# Patient Record
Sex: FEMALE | Race: BLACK OR AFRICAN AMERICAN | NOT HISPANIC OR LATINO | Employment: STUDENT | URBAN - METROPOLITAN AREA
[De-identification: names, ages, dates, MRNs, and addresses within clinical notes are randomized per-mention and may not be internally consistent; named-entity substitution may affect disease eponyms.]

---
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
## 2024-03-07 ENCOUNTER — PATIENT MESSAGE (OUTPATIENT)
Dept: PRIMARY CARE | Facility: CLINIC | Age: 19
End: 2024-03-07

## 2024-03-07 DIAGNOSIS — N76.0 ACUTE VAGINITIS: ICD-10-CM

## 2024-03-08 DIAGNOSIS — N76.0 ACUTE VAGINITIS: Primary | ICD-10-CM

## 2024-03-08 RX ORDER — FLUCONAZOLE 150 MG/1
TABLET ORAL
Qty: 2 TABLET | Refills: 0 | Status: SHIPPED | OUTPATIENT
Start: 2024-03-08 | End: 2024-03-11 | Stop reason: SDUPTHER

## 2024-03-11 RX ORDER — FLUCONAZOLE 150 MG/1
TABLET ORAL
Qty: 2 TABLET | Refills: 0 | Status: SHIPPED | OUTPATIENT
Start: 2024-03-11

## 2024-05-02 DIAGNOSIS — F33.0 MILD EPISODE OF RECURRENT MAJOR DEPRESSIVE DISORDER (CMS-HCC): ICD-10-CM

## 2024-05-02 RX ORDER — SERTRALINE HYDROCHLORIDE 100 MG/1
TABLET, FILM COATED ORAL
Qty: 30 TABLET | Refills: 0 | Status: SHIPPED | OUTPATIENT
Start: 2024-05-02 | End: 2024-05-10

## 2024-05-10 ENCOUNTER — OFFICE VISIT (OUTPATIENT)
Dept: PRIMARY CARE | Facility: CLINIC | Age: 19
End: 2024-05-10
Payer: COMMERCIAL

## 2024-05-10 ENCOUNTER — LAB (OUTPATIENT)
Dept: LAB | Facility: LAB | Age: 19
End: 2024-05-10
Payer: COMMERCIAL

## 2024-05-10 VITALS
BODY MASS INDEX: 22.51 KG/M2 | WEIGHT: 152 LBS | HEART RATE: 100 BPM | SYSTOLIC BLOOD PRESSURE: 116 MMHG | HEIGHT: 69 IN | DIASTOLIC BLOOD PRESSURE: 84 MMHG

## 2024-05-10 DIAGNOSIS — F41.9 ANXIETY: ICD-10-CM

## 2024-05-10 DIAGNOSIS — Z11.59 ENCOUNTER FOR HEPATITIS C SCREENING TEST FOR LOW RISK PATIENT: ICD-10-CM

## 2024-05-10 DIAGNOSIS — Z11.3 ROUTINE SCREENING FOR STI (SEXUALLY TRANSMITTED INFECTION): ICD-10-CM

## 2024-05-10 DIAGNOSIS — Z00.00 ENCOUNTER FOR PREVENTIVE HEALTH EXAMINATION: ICD-10-CM

## 2024-05-10 DIAGNOSIS — Z00.00 ENCOUNTER FOR PREVENTIVE HEALTH EXAMINATION: Primary | ICD-10-CM

## 2024-05-10 PROBLEM — F33.0 MILD EPISODE OF RECURRENT MAJOR DEPRESSIVE DISORDER (CMS-HCC): Status: RESOLVED | Noted: 2023-01-29 | Resolved: 2024-05-10

## 2024-05-10 LAB
ALBUMIN SERPL BCP-MCNC: 4.2 G/DL (ref 3.4–5)
ALP SERPL-CCNC: 65 U/L (ref 33–110)
ALT SERPL W P-5'-P-CCNC: 10 U/L (ref 7–45)
ANION GAP SERPL CALC-SCNC: 11 MMOL/L (ref 10–20)
AST SERPL W P-5'-P-CCNC: 18 U/L (ref 9–39)
BILIRUB SERPL-MCNC: 0.8 MG/DL (ref 0–1.2)
BUN SERPL-MCNC: 10 MG/DL (ref 6–23)
CALCIUM SERPL-MCNC: 9 MG/DL (ref 8.6–10.3)
CHLORIDE SERPL-SCNC: 106 MMOL/L (ref 98–107)
CHOLEST SERPL-MCNC: 157 MG/DL (ref 0–199)
CHOLESTEROL/HDL RATIO: 2.6
CO2 SERPL-SCNC: 25 MMOL/L (ref 21–32)
CREAT SERPL-MCNC: 0.8 MG/DL (ref 0.5–1.05)
EGFRCR SERPLBLD CKD-EPI 2021: >90 ML/MIN/1.73M*2
ERYTHROCYTE [DISTWIDTH] IN BLOOD BY AUTOMATED COUNT: 12.2 % (ref 11.5–14.5)
GLUCOSE SERPL-MCNC: 67 MG/DL (ref 74–99)
HCT VFR BLD AUTO: 42.1 % (ref 36–46)
HCV AB SER QL: NONREACTIVE
HDLC SERPL-MCNC: 59.9 MG/DL
HGB BLD-MCNC: 13.7 G/DL (ref 12–16)
HIV 1+2 AB+HIV1 P24 AG SERPL QL IA: NONREACTIVE
LDLC SERPL CALC-MCNC: 77 MG/DL
MCH RBC QN AUTO: 30.2 PG (ref 26–34)
MCHC RBC AUTO-ENTMCNC: 32.5 G/DL (ref 32–36)
MCV RBC AUTO: 93 FL (ref 80–100)
NON HDL CHOLESTEROL: 97 MG/DL (ref 0–119)
NRBC BLD-RTO: 0 /100 WBCS (ref 0–0)
PLATELET # BLD AUTO: 320 X10*3/UL (ref 150–450)
POTASSIUM SERPL-SCNC: 3.9 MMOL/L (ref 3.5–5.3)
PROT SERPL-MCNC: 7.2 G/DL (ref 6.4–8.2)
RBC # BLD AUTO: 4.54 X10*6/UL (ref 4–5.2)
SODIUM SERPL-SCNC: 138 MMOL/L (ref 136–145)
T4 FREE SERPL-MCNC: 0.74 NG/DL (ref 0.61–1.12)
TRIGL SERPL-MCNC: 102 MG/DL (ref 0–149)
TSH SERPL-ACNC: 4.72 MIU/L (ref 0.44–3.98)
VLDL: 20 MG/DL (ref 0–40)
WBC # BLD AUTO: 7.4 X10*3/UL (ref 4.4–11.3)

## 2024-05-10 PROCEDURE — 80061 LIPID PANEL: CPT

## 2024-05-10 PROCEDURE — 99395 PREV VISIT EST AGE 18-39: CPT | Performed by: STUDENT IN AN ORGANIZED HEALTH CARE EDUCATION/TRAINING PROGRAM

## 2024-05-10 PROCEDURE — 84439 ASSAY OF FREE THYROXINE: CPT

## 2024-05-10 PROCEDURE — 1036F TOBACCO NON-USER: CPT | Performed by: STUDENT IN AN ORGANIZED HEALTH CARE EDUCATION/TRAINING PROGRAM

## 2024-05-10 PROCEDURE — 99214 OFFICE O/P EST MOD 30 MIN: CPT | Performed by: STUDENT IN AN ORGANIZED HEALTH CARE EDUCATION/TRAINING PROGRAM

## 2024-05-10 PROCEDURE — 87389 HIV-1 AG W/HIV-1&-2 AB AG IA: CPT

## 2024-05-10 PROCEDURE — 85027 COMPLETE CBC AUTOMATED: CPT

## 2024-05-10 PROCEDURE — 86803 HEPATITIS C AB TEST: CPT

## 2024-05-10 PROCEDURE — 84443 ASSAY THYROID STIM HORMONE: CPT

## 2024-05-10 PROCEDURE — 83036 HEMOGLOBIN GLYCOSYLATED A1C: CPT

## 2024-05-10 PROCEDURE — 36415 COLL VENOUS BLD VENIPUNCTURE: CPT

## 2024-05-10 PROCEDURE — 80053 COMPREHEN METABOLIC PANEL: CPT

## 2024-05-10 RX ORDER — FLUOXETINE 20 MG/1
TABLET ORAL
Qty: 60 TABLET | Refills: 0 | Status: SHIPPED | OUTPATIENT
Start: 2024-05-10 | End: 2024-06-07

## 2024-05-10 ASSESSMENT — PATIENT HEALTH QUESTIONNAIRE - PHQ9
SUM OF ALL RESPONSES TO PHQ9 QUESTIONS 1 AND 2: 0
1. LITTLE INTEREST OR PLEASURE IN DOING THINGS: NOT AT ALL
2. FEELING DOWN, DEPRESSED OR HOPELESS: NOT AT ALL

## 2024-05-10 NOTE — PROGRESS NOTES
Subjective   Patient ID: Myranda Monsalve is a 19 y.o. female who presents for Annual Exam.    HPI  Diet is well balanced.  Exercises regularly.  Participates in Pilates.  Vaccines are up to date. Has not received HPV vaccines.   Dental exam is up to date.  Vision exam is up to date.  Patient is fasting for labs today.  Social: Tobacco use-never       Alcohol use-drinks socially 3x/month  Other-smokes marijuana infrequently, denies illicit drug use    Other concerns addressed today include:   Anxiety  Patient has been on Zoloft for generalized anxiety for a number of years, with the last titration up to 100 mg daily 2 years ago.  She has not noticed significant benefit from this medication to date.  She reports significant anxiety but it is not interfering with her daily functions.  She denies SI/HI.  She notes flulike symptoms when she misses doses, which happens around 3 times per month.    Vaginal candidiasis  In 2023, patient had recurrent yeast infections, stating she had around a dozen that year.  She was last treated with MetroGel and Diflucan in February, which resolved the symptoms at that time.  She has had no symptoms since then.    Review of Systems  All pertinent positive symptoms are included in the history of present illness.    All other systems have been reviewed and are negative and noncontributory to this patient's current ailments.     Social History     Tobacco Use    Smoking status: Never    Smokeless tobacco: Never   Substance Use Topics    Alcohol use: Not Currently      History reviewed. No pertinent surgical history.   Allergies   Allergen Reactions    Peanut Shortness of breath    Tree Nut Unknown        Current Outpatient Medications   Medication Sig Dispense Refill    buPROPion SR (Wellbutrin SR) 100 mg 12 hr tablet TAKE 1 TABLET BY MOUTH EVERY DAY (Patient not taking: Reported on 5/10/2024) 90 tablet 1    cetirizine (ZyrTEC) 10 mg tablet Take 1 tablet (10 mg) by mouth once daily as  needed.      EPINEPHrine 0.3 mg/0.3 mL injection syringe Inject 0.3ml intramuscularly as directed.      L norgest/e.estradioL-e.estrad (Seasonique) 0.15 mg-30 mcg (84)/10 mcg (7) tablets,dose pack,3 month tablet TAKE 1 TABLET BY MOUTH EVERY DAY 91 tablet 1    sertraline (Zoloft) 100 mg tablet TAKE 1 TABLET BY MOUTH EVERY DAY. PLEASE SELECT/ ESTABISH WITH NEW PCP FOR FUTHER REFILS. 30 tablet 0    fluconazole (Diflucan) 150 mg tablet Take 1 tablet by mouth x 1 dose, repeat in 1 week if needed. (Patient not taking: Reported on 5/10/2024) 2 tablet 0    metroNIDAZOLE (Metrogel) 0.75 % (37.5mg/5 gram) vaginal gel Insert 1 applicatorful vaginally at bedtime for 5 days (Patient not taking: Reported on 5/10/2024) 70 g 0    SUMAtriptan (Imitrex) 50 mg tablet Take 1 tablet (50 mg) by mouth. Take for Migraine relief. May repeat every 2 hours. Max 200mg/day       No current facility-administered medications for this visit.        Patient Active Problem List   Diagnosis    Allergic rhinitis    Generalized anxiety disorder    Dysmenorrhea    Mild episode of recurrent major depressive disorder (CMS-HCC)    Mixed headache    Acute vaginitis      Immunization History   Administered Date(s) Administered    DTaP vaccine, pediatric  (INFANRIX) 2004, 2004, 08/09/2006, 11/09/2007, 07/28/2009    HPV, Quadrivalent 10/28/2016    HPV, Unspecified 08/17/2016    Hep A, Unspecified 11/09/2007, 07/28/2009    Hepatitis B vaccine, adult (RECOMBIVAX, ENGERIX) 2004, 2004, 08/09/2006    HiB PRP-OMP conjugate vaccine, pediatric (PEDVAXHIB) 2004, 2004, 08/09/2006    Influenza, seasonal, injectable 10/06/2020    MMR vaccine, subcutaneous (MMR II) 08/09/2006, 07/28/2009    Meningococcal ACWY vaccine (MENVEO) 09/22/2020    Meningococcal MCV4P 08/13/2015    Pfizer Purple Cap SARS-CoV-2 04/06/2021, 05/04/2021    Pneumococcal polysaccharide vaccine, 23-valent, age 2 years and older (PNEUMOVAX 23) 2004, 2004,  "08/09/2006    Poliovirus vaccine, subcutaneous (IPOL) 2004, 08/09/2006, 11/09/2007, 07/28/2009    SARS-CoV-2, Unspecified 02/05/2022    Tdap vaccine, age 7 year and older (BOOSTRIX, ADACEL) 08/13/2015    Varicella vaccine, subcutaneous (VARIVAX) 08/09/2006, 07/28/2009       Objective   VITALS  /84   Pulse 100   Ht 1.753 m (5' 9\")   Wt 68.9 kg (152 lb)   BMI 22.45 kg/m²      Physical Exam  CONSTITUTIONAL - well nourished, well developed, looks like stated age, in no acute distress, not ill-appearing, and not tired appearing  SKIN - normal skin color and pigmentation, normal skin turgor without rash, lesions, or nodules visualized  HEAD - no trauma, normocephalic  EYES - pupils are equal and reactive to light, extraocular muscles are intact, and normal external exam  ENT - TM's intact, no injection, no signs of infection, uvula midline, normal tongue movement and throat normal, no exudate, nasal passage without discharge and patent  NECK - supple without rigidity, no neck mass was observed, no thyromegaly or thyroid nodules  CHEST - clear to auscultation, no wheezing, no crackles and no rales, good effort  CARDIAC - regular rate and regular rhythm, no skipped beats, no murmur  ABDOMEN - no organomegaly, soft, nontender, nondistended, normal bowel sounds, no guarding/rebound/rigidity, negative McBurney sign and negative White sign  EXTREMITIES - no edema, no deformities  NEUROLOGICAL - normal gait, normal balance, normal motor, no ataxia, DTRs equal and symmetrical; alert, oriented and no focal signs  PSYCHIATRIC - alert, pleasant and cordial, age-appropriate  IMMUNOLOGIC - no cervical lymphadenopathy     Assessment/Plan   Diagnoses and all orders for this visit:  Encounter for preventive health examination  A complete history and physical was performed today.  Vaccines are up to date.  Fasting labs ordered today include:  -     CBC; Future  -     Comprehensive Metabolic Panel; Future  -     Hemoglobin " A1C; Future  -     Lipid Panel; Future  -     TSH with reflex to Free T4 if abnormal; Future  Encounter for hepatitis C screening test for low risk patient  -     Hepatitis C Antibody; Future  Routine screening for STI (sexually transmitted infection)  -     HIV 1/2 Antigen/Antibody Screen with Reflex to Confirmation; Future  Generalized anxiety disorder  Zoloft ineffective to this point        -     Discontinue Zoloft by taking a 1/2 tab daily for two weeks then stop        -     Begin Prozac 20 mg qAM for two weeks, then increase to two tabs a day if tolerating one tab per day    She should let us know how she is doing with the change in a month

## 2024-05-11 LAB
EST. AVERAGE GLUCOSE BLD GHB EST-MCNC: 88 MG/DL
HBA1C MFR BLD: 4.7 %

## 2024-05-13 DIAGNOSIS — R79.89 ELEVATED TSH: Primary | ICD-10-CM

## 2024-06-07 DIAGNOSIS — F41.9 ANXIETY: ICD-10-CM

## 2024-06-07 RX ORDER — FLUOXETINE 20 MG/1
TABLET ORAL
Qty: 180 TABLET | Refills: 1 | Status: SHIPPED | OUTPATIENT
Start: 2024-06-07

## 2024-06-17 DIAGNOSIS — N94.6 DYSMENORRHEA, UNSPECIFIED: ICD-10-CM

## 2024-06-17 RX ORDER — LEVONORGESTREL / ETHINYL ESTRADIOL AND ETHINYL ESTRADIOL 150-30(84)
1 KIT ORAL DAILY
Qty: 91 TABLET | Refills: 0 | Status: SHIPPED | OUTPATIENT
Start: 2024-06-17

## 2024-07-10 DIAGNOSIS — J98.8 RESPIRATORY INFECTION: Primary | ICD-10-CM

## 2024-07-10 RX ORDER — AZITHROMYCIN 250 MG/1
TABLET, FILM COATED ORAL
Qty: 6 TABLET | Refills: 0 | Status: SHIPPED | OUTPATIENT
Start: 2024-07-10 | End: 2024-07-15

## 2024-07-18 ENCOUNTER — APPOINTMENT (OUTPATIENT)
Dept: OBSTETRICS AND GYNECOLOGY | Facility: CLINIC | Age: 19
End: 2024-07-18
Payer: COMMERCIAL

## 2024-07-18 VITALS
HEIGHT: 69 IN | SYSTOLIC BLOOD PRESSURE: 109 MMHG | DIASTOLIC BLOOD PRESSURE: 78 MMHG | WEIGHT: 148 LBS | BODY MASS INDEX: 21.92 KG/M2

## 2024-07-18 DIAGNOSIS — N93.0 PCB (POST COITAL BLEEDING): Primary | ICD-10-CM

## 2024-07-18 DIAGNOSIS — N84.1 CERVICAL POLYP: ICD-10-CM

## 2024-07-18 DIAGNOSIS — N76.0 VAGINAL INFECTION: ICD-10-CM

## 2024-07-18 DIAGNOSIS — Z00.00 HEALTHCARE MAINTENANCE: ICD-10-CM

## 2024-07-18 PROCEDURE — 87205 SMEAR GRAM STAIN: CPT

## 2024-07-18 PROCEDURE — 99204 OFFICE O/P NEW MOD 45 MIN: CPT | Performed by: NURSE PRACTITIONER

## 2024-07-18 PROCEDURE — 3008F BODY MASS INDEX DOCD: CPT | Performed by: NURSE PRACTITIONER

## 2024-07-18 PROCEDURE — 88305 TISSUE EXAM BY PATHOLOGIST: CPT

## 2024-07-18 PROCEDURE — 87491 CHLMYD TRACH DNA AMP PROBE: CPT

## 2024-07-18 PROCEDURE — 88175 CYTOPATH C/V AUTO FLUID REDO: CPT

## 2024-07-18 PROCEDURE — 87661 TRICHOMONAS VAGINALIS AMPLIF: CPT

## 2024-07-18 PROCEDURE — 87591 N.GONORRHOEAE DNA AMP PROB: CPT

## 2024-07-18 PROCEDURE — 1036F TOBACCO NON-USER: CPT | Performed by: NURSE PRACTITIONER

## 2024-07-18 ASSESSMENT — ENCOUNTER SYMPTOMS
CARDIOVASCULAR NEGATIVE: 0
MUSCULOSKELETAL NEGATIVE: 0
NEUROLOGICAL NEGATIVE: 0
CONSTITUTIONAL NEGATIVE: 0
PSYCHIATRIC NEGATIVE: 0
RESPIRATORY NEGATIVE: 0
EYES NEGATIVE: 0
ALLERGIC/IMMUNOLOGIC NEGATIVE: 0
ENDOCRINE NEGATIVE: 0
GASTROINTESTINAL NEGATIVE: 0
HEMATOLOGIC/LYMPHATIC NEGATIVE: 0

## 2024-07-18 ASSESSMENT — PAIN SCALES - GENERAL: PAINLEVEL: 0-NO PAIN

## 2024-07-18 NOTE — PROGRESS NOTES
"Ana Alston, APRN-CNP     Subjective   Myranda Monsalve is a 19 y.o. female who presents for complaint of frequent yeast infections.  They tend to come after her menstrual cycle and she has been treated multiple times by her primary provider.  Patient is not a diabetic but she is on birth control pills.    Patient is also here with a complaint of bleeding with intercourse.  She states that she has blood with sexual intercourse consistently with her current partner and her previous partner as well.  Bleeding can sometimes last for a full week and sometimes just a day.  The bleeding can be heavy at times.  There is also some crampy pain that will occur with the bleeding    Patient is taking 90-day birth control pill to avoid monthly menstrual periods because her periods are very heavy and painful when they occur.  She previously was on monthly cycling birth control pills and tried 2 different brands and her periods were still very heavy and painful.  She is aware of the Kyleena IUD but has heard insertion is a painful process.    Past Medical History:   Diagnosis Date    Personal history of diseases of the skin and subcutaneous tissue     History of eczema    Personal history of other specified conditions 10/05/2016    History of headache     No past surgical history on file.    OB History          0    Para   0    Term   0       0    AB   0    Living   0         SAB   0    IAB   0    Ectopic   0    Multiple   0    Live Births   0               Patient's last menstrual period was 2024.      Review of Systems   Genitourinary:  Positive for menstrual problem, vaginal bleeding and vaginal discharge.   All other systems reviewed and are negative.    Breast: No Complaints   Vaginal: Unexplained Bleeding        Objective   /78   Ht 1.753 m (5' 9\")   Wt 67.1 kg (148 lb)   LMP 2024   BMI 21.86 kg/m²   Physical Exam  Constitutional:       Appearance: She is normal weight. "   Genitourinary:      Genitourinary Comments: Papule like structures anterior cervical os.  Dr. Medina in 2 examine and decision made to Pap cervix and biopsy possible polyp papules at the os.      Right Labia: No rash.     Left Labia: No rash.     No vaginal discharge.         Pulmonary:      Effort: Pulmonary effort is normal.   Musculoskeletal:         General: Normal range of motion.      Cervical back: Normal range of motion.   Neurological:      Mental Status: She is alert.   Psychiatric:         Mood and Affect: Mood normal.                   Assessment/Plan   Problem List Items Addressed This Visit    None  Visit Diagnoses         Codes    PCB (post coital bleeding)    -  Primary N93.0    Vaginal infection     N76.0    Relevant Orders    Trichomonas vaginalis, Nucleic Acid Detection    Vaginitis Gram Stain For Bacterial Vaginosis + Yeast    C. Trachomatis / N. Gonorrhoeae, Amplified Detection    Healthcare maintenance     Z00.00    Relevant Orders    THINPREP PAP TEST    Cervical polyp     N84.1    Relevant Orders    Surgical Pathology Exam        Patient encouraged to avoid intercourse or any thing in the vagina for at least a week.  Provider will call patient with results.

## 2024-07-19 LAB
C TRACH RRNA SPEC QL NAA+PROBE: NEGATIVE
N GONORRHOEA DNA SPEC QL PROBE+SIG AMP: NEGATIVE
T VAGINALIS RRNA SPEC QL NAA+PROBE: NEGATIVE

## 2024-07-20 LAB
CLUE CELLS VAG LPF-#/AREA: NORMAL /[LPF]
NUGENT SCORE: 0
YEAST VAG WET PREP-#/AREA: NORMAL

## 2024-07-25 LAB
LABORATORY COMMENT REPORT: NORMAL
PATH REPORT.FINAL DX SPEC: NORMAL
PATH REPORT.GROSS SPEC: NORMAL
PATH REPORT.RELEVANT HX SPEC: NORMAL
PATH REPORT.TOTAL CANCER: NORMAL

## 2024-07-30 ENCOUNTER — TELEPHONE (OUTPATIENT)
Dept: OBSTETRICS AND GYNECOLOGY | Facility: CLINIC | Age: 19
End: 2024-07-30
Payer: COMMERCIAL

## 2024-07-30 LAB
CYTOLOGY CMNT CVX/VAG CYTO-IMP: NORMAL
LAB AP CONTRACEPTIVE HISTORY: NORMAL
LAB AP HPV HR: NORMAL
LAB AP PAP ADDITIONAL TESTS: NORMAL
LABORATORY COMMENT REPORT: NORMAL
LMP START DATE: NORMAL
PATH REPORT.TOTAL CANCER: NORMAL

## 2024-07-30 NOTE — TELEPHONE ENCOUNTER
CHOCO KEN:    THIS PATIENT CALLED REGARDING, TEST RESULTS SHE RECEIVED IN HER MY CHART. SHE CAN BE REACHED -[phone].      THANK YOU.

## 2024-07-31 NOTE — TELEPHONE ENCOUNTER
RN returned Patient call, verified by name and   RN reviewed that STD, BV and Yeast labs were all negative  RN educated that surgical pathology showed endocervical mucosa with chronic cervicitis and it is recommended she rep[eat her pap in 1 year  Patient verbalizes understanding  Kayli Fernández RN

## 2024-08-20 DIAGNOSIS — N94.6 DYSMENORRHEA, UNSPECIFIED: ICD-10-CM

## 2024-08-27 RX ORDER — LEVONORGESTREL / ETHINYL ESTRADIOL AND ETHINYL ESTRADIOL 150-30(84)
1 KIT ORAL DAILY
Qty: 91 TABLET | Refills: 2 | Status: SHIPPED | OUTPATIENT
Start: 2024-08-27

## 2024-09-06 ENCOUNTER — PATIENT MESSAGE (OUTPATIENT)
Dept: PRIMARY CARE | Facility: CLINIC | Age: 19
End: 2024-09-06
Payer: COMMERCIAL

## 2024-09-06 DIAGNOSIS — N94.6 DYSMENORRHEA, UNSPECIFIED: ICD-10-CM

## 2024-09-09 DIAGNOSIS — N94.6 DYSMENORRHEA, UNSPECIFIED: Primary | ICD-10-CM

## 2024-09-09 RX ORDER — LEVONORGESTREL / ETHINYL ESTRADIOL AND ETHINYL ESTRADIOL 150-30(84)
1 KIT ORAL DAILY
Qty: 91 TABLET | Refills: 2 | Status: SHIPPED | OUTPATIENT
Start: 2024-09-09

## 2024-09-09 RX ORDER — LEVONORGESTREL / ETHINYL ESTRADIOL AND ETHINYL ESTRADIOL 150-30(84)
1 KIT ORAL DAILY
Qty: 91 TABLET | Refills: 2 | Status: CANCELLED | OUTPATIENT
Start: 2024-09-09

## 2024-10-08 ENCOUNTER — APPOINTMENT (OUTPATIENT)
Dept: OBSTETRICS AND GYNECOLOGY | Facility: CLINIC | Age: 19
End: 2024-10-08
Payer: COMMERCIAL

## 2024-10-08 VITALS
BODY MASS INDEX: 23.85 KG/M2 | SYSTOLIC BLOOD PRESSURE: 120 MMHG | DIASTOLIC BLOOD PRESSURE: 70 MMHG | WEIGHT: 161 LBS | HEIGHT: 69 IN

## 2024-10-08 DIAGNOSIS — N94.6 DYSMENORRHEA, UNSPECIFIED: ICD-10-CM

## 2024-10-08 DIAGNOSIS — N92.1 MENORRHAGIA WITH IRREGULAR CYCLE: Primary | ICD-10-CM

## 2024-10-08 PROCEDURE — 3008F BODY MASS INDEX DOCD: CPT | Performed by: OBSTETRICS & GYNECOLOGY

## 2024-10-08 PROCEDURE — 99213 OFFICE O/P EST LOW 20 MIN: CPT | Performed by: OBSTETRICS & GYNECOLOGY

## 2024-10-08 PROCEDURE — 1036F TOBACCO NON-USER: CPT | Performed by: OBSTETRICS & GYNECOLOGY

## 2024-10-08 RX ORDER — CEPHALEXIN 250 MG/1
CAPSULE ORAL
COMMUNITY
Start: 2024-09-30

## 2024-10-08 RX ORDER — NORETHINDRONE ACETATE AND ETHINYL ESTRADIOL 1; 5 MG/1; UG/1
TABLET ORAL
COMMUNITY
Start: 2024-09-29

## 2024-10-08 RX ORDER — MINERAL OIL
1 ENEMA (ML) RECTAL
COMMUNITY
Start: 2024-02-21

## 2024-10-08 RX ORDER — LEVONORGESTREL / ETHINYL ESTRADIOL AND ETHINYL ESTRADIOL 150-30(84)
1 KIT ORAL DAILY
Qty: 91 TABLET | Refills: 2 | Status: SHIPPED | OUTPATIENT
Start: 2024-10-08

## 2024-10-08 ASSESSMENT — ENCOUNTER SYMPTOMS
MUSCULOSKELETAL NEGATIVE: 0
EYES NEGATIVE: 0
RESPIRATORY NEGATIVE: 0
ALLERGIC/IMMUNOLOGIC NEGATIVE: 0
GASTROINTESTINAL NEGATIVE: 0
PSYCHIATRIC NEGATIVE: 0
ENDOCRINE NEGATIVE: 0
NEUROLOGICAL NEGATIVE: 0
CARDIOVASCULAR NEGATIVE: 0
HEMATOLOGIC/LYMPHATIC NEGATIVE: 0
CONSTITUTIONAL NEGATIVE: 0

## 2024-10-08 ASSESSMENT — PAIN SCALES - GENERAL: PAINLEVEL: 0-NO PAIN

## 2024-10-08 NOTE — PROGRESS NOTES
Subjective   Patient ID: Myranda Monsalve is a 19 y.o. female who presents for Abdominal Pain and polyps (Pt was told she had multiple polyps Previous DrJosefina Tried to remove a couple. Pt has been bleeding for the last three weeks just stopped and having severe cramping.).  HPI    Period was supposed to end 3 weeks ago,   Went to the ED last Sunday for heavy menstrual bleeding that was lasting 3 weeks. Was concerned that her period was going on to its third week prompting her to go to the ED.   At that time patient was told to take double of her birth control pills and was treated for a possible UTI.   Bleeding stopped 3 days ago.     Currently takes oral contraceptives. She takes a 90-day birth control pill to avoid monthly periods. Was started on OCPs for heavy periods at the age of 14. Get her period every 3 months, lasting 1 week. Goes through 2-3 pads a day. With lots of cramping.   Did discuss possible IUD placement with her previous OBGYN provider but still needs to think about it. Would like to discuss it with her father who is also a physician at     Patient is sexually active, denies any pain with sex, no concerns for any STDs.    Review of Systems   All pertinent positive symptoms are included in the history of present illness.    All other systems have been reviewed and are negative and noncontributory to this patient's current ailments.    Objective   Physical Exam  CONSTITUTIONAL - well nourished, well developed, looks like stated age, in no acute distress, not ill-appearing, and not tired appearing  SKIN - normal skin color and pigmentation, normal skin turgor without rash, lesions, or nodules visualized  HEAD - no trauma, normocephalic  EYES - normal external exam  CHEST - no distressed breathing, good effort  GENITOURINARY - external genitalia: normal and perianal area: normal   Vagina: normal.   Cervix: Normal.   EXTREMITIES - no edema, no deformities  NEUROLOGICAL - normal balance, normal motor, no  ataxia  PSYCHIATRIC - alert, pleasant and cordial, age-appropriate      Assessment/Plan   Diagnoses and all orders for this visit:  Menorrhagia with irregular cycle  Dysmenorrhea, unspecified  -     L norgest/e.estradioL-e.estrad (Seasonique) 0.15 mg-30 mcg (84)/10 mcg (7) tablets,dose pack,3 month tablet; Take 1 tablet by mouth once daily.  Discussed treatment recommendations such as IUD placement to help decrease bleeding and further regular her menstrual cycle.   Patient to think about IUD placement and will let us know what she decides.        This patient was seen and staffed with Dr. Friend.    Irena Cano DO  PGY 2  Family Medicine

## 2024-11-18 DIAGNOSIS — N94.6 DYSMENORRHEA, UNSPECIFIED: ICD-10-CM

## 2024-11-18 RX ORDER — LEVONORGESTREL / ETHINYL ESTRADIOL AND ETHINYL ESTRADIOL 150-30(84)
1 KIT ORAL DAILY
Qty: 91 TABLET | Refills: 2 | Status: SHIPPED | OUTPATIENT
Start: 2024-11-18

## 2024-12-12 ENCOUNTER — APPOINTMENT (OUTPATIENT)
Dept: OBSTETRICS AND GYNECOLOGY | Facility: CLINIC | Age: 19
End: 2024-12-12
Payer: COMMERCIAL

## 2024-12-26 ENCOUNTER — APPOINTMENT (OUTPATIENT)
Dept: OBSTETRICS AND GYNECOLOGY | Facility: CLINIC | Age: 19
End: 2024-12-26
Payer: COMMERCIAL

## 2024-12-26 VITALS
BODY MASS INDEX: 23.27 KG/M2 | WEIGHT: 157.6 LBS | HEART RATE: 80 BPM | DIASTOLIC BLOOD PRESSURE: 78 MMHG | SYSTOLIC BLOOD PRESSURE: 116 MMHG

## 2024-12-26 DIAGNOSIS — N92.1 BREAKTHROUGH BLEEDING ON BIRTH CONTROL PILLS: Primary | ICD-10-CM

## 2024-12-26 DIAGNOSIS — Z11.3 SCREENING FOR STD (SEXUALLY TRANSMITTED DISEASE): ICD-10-CM

## 2024-12-26 PROCEDURE — 99213 OFFICE O/P EST LOW 20 MIN: CPT | Performed by: NURSE PRACTITIONER

## 2024-12-26 PROCEDURE — 1036F TOBACCO NON-USER: CPT | Performed by: NURSE PRACTITIONER

## 2024-12-26 RX ORDER — METHYLPREDNISOLONE 4 MG/1
TABLET ORAL
COMMUNITY
Start: 2024-12-17

## 2024-12-26 RX ORDER — IBUPROFEN 600 MG/1
1 TABLET ORAL EVERY 6 HOURS
COMMUNITY
Start: 2024-12-17

## 2024-12-26 RX ORDER — CHLORHEXIDINE GLUCONATE ORAL RINSE 1.2 MG/ML
SOLUTION DENTAL
COMMUNITY
Start: 2024-12-19

## 2024-12-26 NOTE — PROGRESS NOTES
"Subjective   Patient ID: Myranda Monsalve \"Bob\" is a 19 y.o. female who presents for Vaginal Bleeding (Patient here for recurrent vaginal bleeding since September states she had really bad bleeding with cramps /Patient states she's having cramps 5/10 sharp pain last 2 mins with lower back pain /Patient states during sex she has a lot of bright red bleeding/Patient is currently on menstrual has dark brown bleeding/LMP 12/19/24/Last pap 7/18/24 /).  Vaginal Bleeding      Patient here today with a continued complaint of bleeding with sex and breakthrough bleeding on her pills.  She currently takes pills for 84 days.  She states that they used to work perfectly but now she is bleeding throughout the cycle.  She is also having a lot of cramping which is why she was taking the pills in the first place.    She did see Dr. Friend since her last visit here and Dr. Friend had encouraged her to consider an IUD to help with her bleeding.    This provider again offers IUD or a different type of pill which the patient is not interested in trying.  Review of Systems   Genitourinary:  Positive for menstrual problem and vaginal bleeding.   All other systems reviewed and are negative.      Objective   Physical Exam  Deferred  Assessment/Plan   Problem List Items Addressed This Visit    None  Visit Diagnoses         Codes    Breakthrough bleeding on birth control pills    -  Primary N92.1    Screening for STD (sexually transmitted disease)     Z11.3    Relevant Orders    C. trachomatis / N. gonorrhoeae, Amplified    Trichomonas vaginalis, Amplified        Return for placement of IUD.         Ana Alston, KIT-CNP 12/26/24 12:56 PM   "

## 2025-01-03 ENCOUNTER — APPOINTMENT (OUTPATIENT)
Dept: OBSTETRICS AND GYNECOLOGY | Facility: HOSPITAL | Age: 20
End: 2025-01-03
Payer: COMMERCIAL

## 2025-07-28 DIAGNOSIS — N94.6 DYSMENORRHEA, UNSPECIFIED: ICD-10-CM

## 2025-07-28 RX ORDER — LEVONORGESTREL / ETHINYL ESTRADIOL AND ETHINYL ESTRADIOL 150-30(84)
1 KIT ORAL DAILY
Qty: 91 TABLET | Refills: 0 | Status: SHIPPED | OUTPATIENT
Start: 2025-07-28

## 2025-08-18 ENCOUNTER — APPOINTMENT (OUTPATIENT)
Dept: PRIMARY CARE | Facility: CLINIC | Age: 20
End: 2025-08-18
Payer: COMMERCIAL

## 2025-09-29 ENCOUNTER — APPOINTMENT (OUTPATIENT)
Dept: OBSTETRICS AND GYNECOLOGY | Facility: CLINIC | Age: 20
End: 2025-09-29
Payer: COMMERCIAL

## 2025-10-10 ENCOUNTER — APPOINTMENT (OUTPATIENT)
Dept: PRIMARY CARE | Facility: CLINIC | Age: 20
End: 2025-10-10
Payer: COMMERCIAL